# Patient Record
Sex: MALE | Race: WHITE | NOT HISPANIC OR LATINO
[De-identification: names, ages, dates, MRNs, and addresses within clinical notes are randomized per-mention and may not be internally consistent; named-entity substitution may affect disease eponyms.]

---

## 2019-06-05 ENCOUNTER — APPOINTMENT (OUTPATIENT)
Dept: ORTHOPEDIC SURGERY | Facility: CLINIC | Age: 44
End: 2019-06-05

## 2022-04-13 ENCOUNTER — APPOINTMENT (OUTPATIENT)
Dept: ORTHOPEDIC SURGERY | Facility: CLINIC | Age: 47
End: 2022-04-13
Payer: OTHER GOVERNMENT

## 2022-04-13 VITALS — HEIGHT: 72 IN | BODY MASS INDEX: 30.48 KG/M2 | WEIGHT: 225 LBS

## 2022-04-13 DIAGNOSIS — Z78.9 OTHER SPECIFIED HEALTH STATUS: ICD-10-CM

## 2022-04-13 PROCEDURE — 99072 ADDL SUPL MATRL&STAF TM PHE: CPT

## 2022-04-13 PROCEDURE — 99214 OFFICE O/P EST MOD 30 MIN: CPT

## 2022-04-13 NOTE — DISCUSSION/SUMMARY
[Medication Risks Reviewed] : Medication risks reviewed [de-identified] : lidoderm\par HEP\par Ice.

## 2022-04-13 NOTE — ASSESSMENT
[FreeTextEntry1] : Patient allowed to gently start resuming activities. \par Discussed change to medication prescription and usage. \par Bracing options discussed with patient. \par Activity modification as needed\par Discussed poss future surgery, pt deciding, he is leaning towards living with it\par

## 2022-04-13 NOTE — HISTORY OF PRESENT ILLNESS
[4] : 4 [2] : 2 [Dull/Aching] : dull/aching [Sharp] : sharp [Intermittent] : intermittent [Rest] : rest [Not working due to injury] : Work status: not working due to injury [de-identified] : Patients left shoulder remains symptomatic, persistent symptoms with certain motions, lifting, pushing pulling.  No radicular complaints, uses llidoderm patches. pt is here today for a WC follow up of his left shoulder. pt states his level of pain is  [] : no [FreeTextEntry1] : left shoulder  [de-identified] : motion

## 2022-04-13 NOTE — PHYSICAL EXAM
[Left] : left shoulder [Sitting] : sitting [Mild] : mild [] : no ecchymosis [TWNoteComboBox7] : active forward flexion 160 degrees [TWNoteComboBox4] : False [TWNoteComboBox6] : internal rotation L2 [de-identified] : external rotation 65 degrees

## 2022-04-13 NOTE — WORK
[Total] : total [Does not reveal pre-existing condition(s) that may affect treatment/prognosis] : does not reveal pre-existing condition(s) that may affect treatment/prognosis [Cannot return to work because ________] : cannot return to work because [unfilled] [Unknown at this time] : : unknown at this time [Patient] : patient [No] : No [No Rx restrictions] : No Rx restrictions. [I provided the services listed above] :  I provided the services listed above. 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

## 2022-05-25 ENCOUNTER — APPOINTMENT (OUTPATIENT)
Dept: ORTHOPEDIC SURGERY | Facility: CLINIC | Age: 47
End: 2022-05-25
Payer: OTHER GOVERNMENT

## 2022-05-25 VITALS — BODY MASS INDEX: 30.48 KG/M2 | HEIGHT: 72 IN | WEIGHT: 225 LBS

## 2022-05-25 PROCEDURE — 99214 OFFICE O/P EST MOD 30 MIN: CPT

## 2022-05-25 PROCEDURE — 99072 ADDL SUPL MATRL&STAF TM PHE: CPT

## 2022-05-25 RX ORDER — LIDOCAINE 5% 700 MG/1
5 PATCH TOPICAL
Qty: 30 | Refills: 0 | Status: ACTIVE | COMMUNITY
Start: 2022-05-25 | End: 1900-01-01

## 2022-05-25 NOTE — PHYSICAL EXAM
[Left] : left shoulder [Sitting] : sitting [Mild] : mild [] : motor and sensory intact distally [TWNoteComboBox7] : active forward flexion 160 degrees [TWNoteComboBox6] : internal rotation L2 [de-identified] : external rotation 65 degrees

## 2022-05-25 NOTE — HISTORY OF PRESENT ILLNESS
[Dull/Aching] : dull/aching [Localized] : localized [Sharp] : sharp [Intermittent] : intermittent [Leisure] : leisure [Sleep] : sleep [Rest] : rest [Meds] : meds [de-identified] : Patients left shoulder remains symptomatic, especially with certain motions, lifting, pushing pulling.  No radicular complaints, uses llidoderm patches. pt is here today for a WC follow up of his left shoulder.  [4] : 4 [2] : 2 [Not working due to injury] : Work status: not working due to injury [] : yes [FreeTextEntry1] : left shoulder  [FreeTextEntry5] : workers comp follow up [FreeTextEntry9] : l [de-identified] : motion  [de-identified] :  [de-identified] : XR

## 2022-05-25 NOTE — DISCUSSION/SUMMARY
[Medication Risks Reviewed] : Medication risks reviewed [de-identified] : lidoderm patches as needed\par HEP\par Ice.

## 2022-07-06 ENCOUNTER — APPOINTMENT (OUTPATIENT)
Dept: ORTHOPEDIC SURGERY | Facility: CLINIC | Age: 47
End: 2022-07-06

## 2022-07-06 VITALS — WEIGHT: 225 LBS | HEIGHT: 72 IN | BODY MASS INDEX: 30.48 KG/M2

## 2022-07-06 PROCEDURE — 99072 ADDL SUPL MATRL&STAF TM PHE: CPT

## 2022-07-06 PROCEDURE — 99214 OFFICE O/P EST MOD 30 MIN: CPT

## 2022-07-06 RX ORDER — LIDOCAINE 5% 700 MG/1
5 PATCH TOPICAL
Qty: 90 | Refills: 0 | Status: ACTIVE | COMMUNITY
Start: 2022-07-06 | End: 1900-01-01

## 2022-07-06 NOTE — PHYSICAL EXAM
[Left] : left shoulder [Sitting] : sitting [Mild] : mild [] : motor and sensory intact distally [TWNoteComboBox7] : active forward flexion 160 degrees [TWNoteComboBox6] : internal rotation L2 [de-identified] : external rotation 65 degrees

## 2022-07-06 NOTE — HISTORY OF PRESENT ILLNESS
[Sharp] : sharp [Not working due to injury] : Work status: not working due to injury [de-identified] : Patients left shoulder remains symptomatic, especially with certain motions, lifting, pushing pulling.  No radicular complaints, uses llidoderm patches with help. pt is here today for a WC follow up of his left shoulder.  [4] : 4 [2] : 2 [Dull/Aching] : dull/aching [Localized] : localized [Intermittent] : intermittent [Leisure] : leisure [Sleep] : sleep [Rest] : rest [Meds] : meds [] : Post Surgical Visit: no [FreeTextEntry1] : left shoulder  [FreeTextEntry5] : workers comp follow up [de-identified] : motion  [de-identified] :  [de-identified] : XR

## 2022-07-06 NOTE — WORK
[Total] : total [Does not reveal pre-existing condition(s) that may affect treatment/prognosis] : does not reveal pre-existing condition(s) that may affect treatment/prognosis [Cannot return to work because ________] : cannot return to work because [unfilled] [Unknown at this time] : : unknown at this time [Patient] : patient [No] : No [No Rx restrictions] : No Rx restrictions. [I provided the services listed above] :  I provided the services listed above. [FreeTextEntry1] : poor

## 2022-07-06 NOTE — DISCUSSION/SUMMARY
[Medication Risks Reviewed] : Medication risks reviewed [de-identified] : lidoderm patches as needed\par HEP\par Ice.

## 2022-08-23 ENCOUNTER — APPOINTMENT (OUTPATIENT)
Dept: ORTHOPEDIC SURGERY | Facility: CLINIC | Age: 47
End: 2022-08-23

## 2022-08-24 ENCOUNTER — APPOINTMENT (OUTPATIENT)
Dept: ORTHOPEDIC SURGERY | Facility: CLINIC | Age: 47
End: 2022-08-24

## 2022-08-24 VITALS — BODY MASS INDEX: 26.03 KG/M2 | HEIGHT: 78 IN | WEIGHT: 225 LBS

## 2022-08-24 PROCEDURE — 99213 OFFICE O/P EST LOW 20 MIN: CPT

## 2022-08-24 PROCEDURE — 99072 ADDL SUPL MATRL&STAF TM PHE: CPT

## 2022-08-24 NOTE — DISCUSSION/SUMMARY
[Medication Risks Reviewed] : Medication risks reviewed [de-identified] : lidoderm patches as needed\par HEP\par Ice.

## 2022-08-24 NOTE — PHYSICAL EXAM
[Left] : left shoulder [Sitting] : sitting [Mild] : mild [] : no ecchymosis [TWNoteComboBox7] : active forward flexion 160 degrees [TWNoteComboBox6] : internal rotation L2 [de-identified] : external rotation 65 degrees

## 2022-08-24 NOTE — HISTORY OF PRESENT ILLNESS
[Sharp] : sharp [Intermittent] : intermittent [Leisure] : leisure [Sleep] : sleep [Rest] : rest [Meds] : meds [Ice] : ice [Massage] : massage [Not working due to injury] : Work status: not working due to injury [4] : 4 [2] : 2 [Dull/Aching] : dull/aching [Localized] : localized [de-identified] : Patients left shoulder remains symptomatic, especially with certain motions, lifting, pushing pulling.  No radicular complaints, uses llidoderm patches with help. pt is here today for a WC follow up of his left shoulder.  [] : Post Surgical Visit: no [FreeTextEntry1] : left shoulder  [FreeTextEntry3] : 6/30/06 [FreeTextEntry5] : workers comp follow up [de-identified] : motion  [de-identified] :  [de-identified] : XR [de-identified] : lidocaine patches \par brace

## 2022-10-06 ENCOUNTER — APPOINTMENT (OUTPATIENT)
Dept: ORTHOPEDIC SURGERY | Facility: CLINIC | Age: 47
End: 2022-10-06

## 2022-10-06 VITALS — WEIGHT: 220 LBS | BODY MASS INDEX: 29.8 KG/M2 | HEIGHT: 72 IN

## 2022-10-06 PROCEDURE — 99072 ADDL SUPL MATRL&STAF TM PHE: CPT

## 2022-10-06 PROCEDURE — 99214 OFFICE O/P EST MOD 30 MIN: CPT

## 2022-10-06 RX ORDER — LIDOCAINE 5% 700 MG/1
5 PATCH TOPICAL
Qty: 60 | Refills: 0 | Status: ACTIVE | COMMUNITY
Start: 2022-10-06 | End: 1900-01-01

## 2022-10-06 NOTE — HISTORY OF PRESENT ILLNESS
[Sharp] : sharp [Not working due to injury] : Work status: not working due to injury [de-identified] : Patients left shoulder remains symptomatic, especially with certain motions, lifting, pushing pulling.  No radicular complaints, uses llidoderm patches  help. pt is here today for a WC follow up of his left shoulder.  [4] : 4 [2] : 2 [Dull/Aching] : dull/aching [Localized] : localized [Intermittent] : intermittent [Leisure] : leisure [Sleep] : sleep [Rest] : rest [Meds] : meds [Ice] : ice [Massage] : massage [] : Post Surgical Visit: no [FreeTextEntry1] : left shoulder  [FreeTextEntry3] : 6/30/06 [FreeTextEntry5] : workers comp follow up [de-identified] : motion  [de-identified] :  [de-identified] : XR [de-identified] : lidocaine patches \par brace

## 2022-10-06 NOTE — DISCUSSION/SUMMARY
[Medication Risks Reviewed] : Medication risks reviewed [de-identified] : lidoderm patches as needed\par HEP\par Ice.

## 2022-10-06 NOTE — PHYSICAL EXAM
[Left] : left shoulder [Sitting] : sitting [Mild] : mild [] : motor and sensory intact distally [TWNoteComboBox7] : active forward flexion 160 degrees [TWNoteComboBox6] : internal rotation L2 [de-identified] : external rotation 65 degrees

## 2022-11-17 ENCOUNTER — APPOINTMENT (OUTPATIENT)
Dept: ORTHOPEDIC SURGERY | Facility: CLINIC | Age: 47
End: 2022-11-17

## 2022-11-17 VITALS — HEIGHT: 72 IN | BODY MASS INDEX: 29.8 KG/M2 | WEIGHT: 220 LBS

## 2022-11-17 PROCEDURE — 99214 OFFICE O/P EST MOD 30 MIN: CPT

## 2022-11-17 PROCEDURE — 99072 ADDL SUPL MATRL&STAF TM PHE: CPT

## 2022-11-17 NOTE — PHYSICAL EXAM
[Left] : left shoulder [Sitting] : sitting [Mild] : mild [] : motor and sensory intact distally [TWNoteComboBox7] : active forward flexion 160 degrees [TWNoteComboBox6] : internal rotation L2 [de-identified] : external rotation 65 degrees

## 2022-11-17 NOTE — HISTORY OF PRESENT ILLNESS
[Sharp] : sharp [Stabbing] : stabbing [Not working due to injury] : Work status: not working due to injury [de-identified] : Patients left shoulder remains symptomatic, especially with certain motions, lifting, pushing pulling.  No radicular complaints, uses llidoderm patches  help. pt is here today for a WC follow up of his left shoulder.  [4] : 4 [2] : 2 [Dull/Aching] : dull/aching [Localized] : localized [Intermittent] : intermittent [Leisure] : leisure [Sleep] : sleep [Rest] : rest [Meds] : meds [Ice] : ice [Massage] : massage [] : Post Surgical Visit: no [FreeTextEntry1] : left shoulder  [FreeTextEntry3] : 6/30/06 [FreeTextEntry5] : workers comp follow up [de-identified] : motion  [de-identified] :  [de-identified] : XR [de-identified] : lidocaine patches \par brace

## 2022-11-17 NOTE — DISCUSSION/SUMMARY
[Medication Risks Reviewed] : Medication risks reviewed [de-identified] : lidoderm patches as needed\par HEP\par Ice.

## 2022-12-29 ENCOUNTER — APPOINTMENT (OUTPATIENT)
Dept: ORTHOPEDIC SURGERY | Facility: CLINIC | Age: 47
End: 2022-12-29

## 2023-01-04 ENCOUNTER — APPOINTMENT (OUTPATIENT)
Dept: ORTHOPEDIC SURGERY | Facility: CLINIC | Age: 48
End: 2023-01-04
Payer: OTHER GOVERNMENT

## 2023-01-04 VITALS — WEIGHT: 220 LBS | HEIGHT: 72 IN | BODY MASS INDEX: 29.8 KG/M2

## 2023-01-04 PROCEDURE — 99072 ADDL SUPL MATRL&STAF TM PHE: CPT

## 2023-01-04 PROCEDURE — 99214 OFFICE O/P EST MOD 30 MIN: CPT

## 2023-01-04 RX ORDER — LIDOCAINE 5% 700 MG/1
5 PATCH TOPICAL
Qty: 30 | Refills: 0 | Status: ACTIVE | COMMUNITY
Start: 2023-01-04 | End: 1900-01-01

## 2023-01-04 NOTE — HISTORY OF PRESENT ILLNESS
[Sharp] : sharp [Stabbing] : stabbing [Not working due to injury] : Work status: not working due to injury [de-identified] : Patients left shoulder remains symptomatic, especially with certain motions, lifting, pushing pulling.  No radicular complaints, uses llidoderm patches [4] : 4 [2] : 2 [Dull/Aching] : dull/aching [Localized] : localized [Intermittent] : intermittent [Leisure] : leisure [Sleep] : sleep [Rest] : rest [Meds] : meds [Ice] : ice [Massage] : massage [] : Post Surgical Visit: no [FreeTextEntry1] : left shoulder  [FreeTextEntry3] : 6/30/06 [FreeTextEntry5] : workers comp follow up [de-identified] : motion  [de-identified] :  [de-identified] : XR [de-identified] : lidocaine patches \par brace

## 2023-01-04 NOTE — DISCUSSION/SUMMARY
[Medication Risks Reviewed] : Medication risks reviewed [de-identified] : lidoderm patches as needed\par HEP\par Ice.

## 2023-01-04 NOTE — PHYSICAL EXAM
[Left] : left shoulder [Sitting] : sitting [Mild] : mild [] : no ecchymosis [TWNoteComboBox7] : active forward flexion 160 degrees [TWNoteComboBox6] : internal rotation L2 [de-identified] : external rotation 65 degrees

## 2023-02-15 ENCOUNTER — APPOINTMENT (OUTPATIENT)
Dept: ORTHOPEDIC SURGERY | Facility: CLINIC | Age: 48
End: 2023-02-15
Payer: OTHER GOVERNMENT

## 2023-02-15 VITALS — BODY MASS INDEX: 29.8 KG/M2 | HEIGHT: 72 IN | WEIGHT: 220 LBS

## 2023-02-15 PROCEDURE — 99213 OFFICE O/P EST LOW 20 MIN: CPT

## 2023-02-15 PROCEDURE — 99072 ADDL SUPL MATRL&STAF TM PHE: CPT

## 2023-02-15 NOTE — DISCUSSION/SUMMARY
[Medication Risks Reviewed] : Medication risks reviewed [de-identified] : lidoderm patches as needed\par HEP\par Ice.\par mod activity

## 2023-02-15 NOTE — PHYSICAL EXAM
[Left] : left shoulder [Sitting] : sitting [Mild] : mild [] : motor and sensory intact distally [TWNoteComboBox7] : active forward flexion 160 degrees [TWNoteComboBox6] : internal rotation L2 [de-identified] : external rotation 65 degrees

## 2023-02-15 NOTE — HISTORY OF PRESENT ILLNESS
[Sharp] : sharp [Stabbing] : stabbing [Not working due to injury] : Work status: not working due to injury [de-identified] : Patients left shoulder remains symptomatic, especially with certain motions, lifting, pushing pulling.  No radicular complaints, uses llidoderm patches when needed [4] : 4 [2] : 2 [Dull/Aching] : dull/aching [Localized] : localized [Intermittent] : intermittent [Leisure] : leisure [Sleep] : sleep [Rest] : rest [Meds] : meds [Ice] : ice [Massage] : massage [] : Post Surgical Visit: no [FreeTextEntry1] : left shoulder  [FreeTextEntry3] : 6/30/06 [FreeTextEntry5] : workers comp follow up [de-identified] : motion  [de-identified] :  [de-identified] : XR [de-identified] : lidocaine patches \par brace

## 2023-03-29 ENCOUNTER — APPOINTMENT (OUTPATIENT)
Dept: ORTHOPEDIC SURGERY | Facility: CLINIC | Age: 48
End: 2023-03-29
Payer: OTHER GOVERNMENT

## 2023-03-29 VITALS — WEIGHT: 220 LBS | HEIGHT: 72 IN | BODY MASS INDEX: 29.8 KG/M2

## 2023-03-29 PROCEDURE — 99214 OFFICE O/P EST MOD 30 MIN: CPT

## 2023-03-29 RX ORDER — LIDOCAINE 5% 700 MG/1
5 PATCH TOPICAL
Qty: 30 | Refills: 2 | Status: ACTIVE | COMMUNITY
Start: 2023-03-29 | End: 1900-01-01

## 2023-03-29 NOTE — WORK
[Total (100%)] : total (100%) [Does not reveal pre-existing condition(s) that may affect treatment/prognosis] : does not reveal pre-existing condition(s) that may affect treatment/prognosis [Cannot return to work because ________] : cannot return to work because [unfilled] [Unknown at this time] : : unknown at this time [Patient] : patient [No] : No [No Rx restrictions] : No Rx restrictions. [I provided the services listed above] :  I provided the services listed above. [FreeTextEntry1] : poor

## 2023-03-29 NOTE — ASSESSMENT
[FreeTextEntry1] : Patient allowed to gently start resuming activities. \par Discussed change to medication prescription and usage. \par Activity modification as needed\par Discussed poss future surgery, pt deciding, he is leaning towards living with it\par he was told needed to come in every 6 weeks for re-eval\par

## 2023-03-29 NOTE — HISTORY OF PRESENT ILLNESS
[4] : 4 [2] : 2 [Dull/Aching] : dull/aching [Localized] : localized [Sharp] : sharp [Stabbing] : stabbing [Intermittent] : intermittent [Leisure] : leisure [Sleep] : sleep [Rest] : rest [Meds] : meds [Ice] : ice [Massage] : massage [Not working due to injury] : Work status: not working due to injury [de-identified] : Patients left shoulder remains symptomatic, especially with certain motions, lifting, pushing pulling.  No radicular complaints, uses llidoderm patches when needed, had flare up last week [] : Post Surgical Visit: no [FreeTextEntry1] : left shoulder  [FreeTextEntry3] : 6/30/06 [FreeTextEntry5] : workers comp follow up [de-identified] : motion  [de-identified] :  [de-identified] : XR [de-identified] : lidocaine patches \par brace

## 2023-03-29 NOTE — DISCUSSION/SUMMARY
[Medication Risks Reviewed] : Medication risks reviewed [de-identified] : lidoderm patches as needed\par HEP\par Ice.\par mod activity

## 2023-03-29 NOTE — PHYSICAL EXAM
[Left] : left shoulder [Sitting] : sitting [Mild] : mild [] : motor and sensory intact distally [TWNoteComboBox7] : active forward flexion 165 degrees [TWNoteComboBox6] : internal rotation L2 [de-identified] : external rotation 65 degrees

## 2023-05-11 ENCOUNTER — APPOINTMENT (OUTPATIENT)
Dept: ORTHOPEDIC SURGERY | Facility: CLINIC | Age: 48
End: 2023-05-11
Payer: OTHER GOVERNMENT

## 2023-05-11 VITALS — BODY MASS INDEX: 29.8 KG/M2 | WEIGHT: 220 LBS | HEIGHT: 72 IN

## 2023-05-11 PROCEDURE — 99213 OFFICE O/P EST LOW 20 MIN: CPT

## 2023-05-11 NOTE — PHYSICAL EXAM
[Left] : left shoulder [Sitting] : sitting [Mild] : mild [] : no ecchymosis [TWNoteComboBox7] : active forward flexion 165 degrees [TWNoteComboBox4] : passive forward flexion 170 degrees [TWNoteComboBox6] : internal rotation L2 [de-identified] : external rotation 65 degrees

## 2023-05-11 NOTE — DISCUSSION/SUMMARY
[Medication Risks Reviewed] : Medication risks reviewed [de-identified] : lidoderm patches as needed\par HEP\par Ice.\par mod activity\par [place] [facility] wants him to come in every 6 weeks

## 2023-05-11 NOTE — HISTORY OF PRESENT ILLNESS
[9] : 9 [6] : 6 [Not working due to injury] : Work status: not working due to injury [] : yes [de-identified] : Patients left shoulder remains symptomatic, especially with certain motions, lifting, pushing pulling.  No radicular complaints, uses llidoderm patches when needed,feels the same [FreeTextEntry1] : left shoulder  [de-identified] : electric stim used and lidoderm patches used as needed

## 2023-06-22 ENCOUNTER — APPOINTMENT (OUTPATIENT)
Dept: ORTHOPEDIC SURGERY | Facility: CLINIC | Age: 48
End: 2023-06-22
Payer: OTHER GOVERNMENT

## 2023-06-22 VITALS — HEIGHT: 72 IN | WEIGHT: 220 LBS | BODY MASS INDEX: 29.8 KG/M2

## 2023-06-22 PROCEDURE — 99213 OFFICE O/P EST LOW 20 MIN: CPT

## 2023-06-22 RX ORDER — LIDOCAINE 5% 700 MG/1
5 PATCH TOPICAL
Qty: 30 | Refills: 0 | Status: ACTIVE | COMMUNITY
Start: 2023-06-22 | End: 1900-01-01

## 2023-06-22 NOTE — HISTORY OF PRESENT ILLNESS
[9] : 9 [6] : 6 [Not working due to injury] : Work status: not working due to injury [] : yes [de-identified] : Patients left shoulder remains symptomatic, especially with certain motions, felt a "teariing" sensation last week [FreeTextEntry1] : left shoulder  [de-identified] : electric stim used and lidoderm patches used as needed

## 2023-06-22 NOTE — DISCUSSION/SUMMARY
[Medication Risks Reviewed] : Medication risks reviewed [de-identified] : lidoderm patches as needed\par HEP\par Ice.\par mod activity

## 2023-06-22 NOTE — PHYSICAL EXAM
[Left] : left shoulder [Sitting] : sitting [Mild] : mild [] : no swelling [TWNoteComboBox7] : active forward flexion 165 degrees [TWNoteComboBox4] : passive forward flexion 170 degrees [TWNoteComboBox6] : internal rotation 10 degrees [de-identified] : external rotation 65 degrees

## 2023-08-02 ENCOUNTER — APPOINTMENT (OUTPATIENT)
Dept: ORTHOPEDIC SURGERY | Facility: CLINIC | Age: 48
End: 2023-08-02

## 2023-08-02 ENCOUNTER — APPOINTMENT (OUTPATIENT)
Dept: ORTHOPEDIC SURGERY | Facility: CLINIC | Age: 48
End: 2023-08-02
Payer: OTHER GOVERNMENT

## 2023-08-02 VITALS — HEIGHT: 72 IN | WEIGHT: 215 LBS | BODY MASS INDEX: 29.12 KG/M2

## 2023-08-02 PROCEDURE — 99214 OFFICE O/P EST MOD 30 MIN: CPT

## 2023-08-02 NOTE — PHYSICAL EXAM
[Left] : left shoulder [Sitting] : sitting [Mild] : mild [] : motor and sensory intact distally [TWNoteComboBox7] : active forward flexion 165 degrees [TWNoteComboBox4] : passive forward flexion 170 degrees [TWNoteComboBox6] : internal rotation 10 degrees [de-identified] : external rotation 65 degrees

## 2023-08-02 NOTE — HISTORY OF PRESENT ILLNESS
[Work related] : work related [9] : 9 [Not working due to injury] : Work status: not working due to injury [] : yes [de-identified] : Patients left shoulder remains symptomatic, especially with certain motions, felt a "teariing" sensation last week Feels the same [6] : 6 [FreeTextEntry1] : left shoulder  [FreeTextEntry3] : 06/30/2006 [de-identified] : electric stim used and lidoderm patches used as needed

## 2023-09-13 ENCOUNTER — APPOINTMENT (OUTPATIENT)
Dept: ORTHOPEDIC SURGERY | Facility: CLINIC | Age: 48
End: 2023-09-13
Payer: OTHER GOVERNMENT

## 2023-09-13 VITALS — HEIGHT: 72 IN | BODY MASS INDEX: 29.12 KG/M2 | WEIGHT: 215 LBS

## 2023-09-13 VITALS — WEIGHT: 215 LBS | HEIGHT: 72 IN | BODY MASS INDEX: 29.12 KG/M2

## 2023-09-13 PROCEDURE — 99214 OFFICE O/P EST MOD 30 MIN: CPT

## 2023-09-13 RX ORDER — LIDOCAINE 5% 700 MG/1
5 PATCH TOPICAL
Qty: 30 | Refills: 0 | Status: ACTIVE | COMMUNITY
Start: 2023-09-13 | End: 1900-01-01

## 2023-10-25 ENCOUNTER — APPOINTMENT (OUTPATIENT)
Dept: ORTHOPEDIC SURGERY | Facility: CLINIC | Age: 48
End: 2023-10-25
Payer: OTHER GOVERNMENT

## 2023-10-25 VITALS — BODY MASS INDEX: 29.12 KG/M2 | HEIGHT: 72 IN | WEIGHT: 215 LBS

## 2023-10-25 DIAGNOSIS — S43.439A SUPERIOR GLENOID LABRUM LESION OF UNSPECIFIED SHOULDER, INITIAL ENCOUNTER: ICD-10-CM

## 2023-10-25 PROCEDURE — 99214 OFFICE O/P EST MOD 30 MIN: CPT

## 2023-10-25 RX ORDER — LIDOCAINE 5% 700 MG/1
5 PATCH TOPICAL
Qty: 30 | Refills: 0 | Status: ACTIVE | COMMUNITY
Start: 2023-10-25 | End: 1900-01-01

## 2023-12-06 ENCOUNTER — APPOINTMENT (OUTPATIENT)
Dept: ORTHOPEDIC SURGERY | Facility: CLINIC | Age: 48
End: 2023-12-06
Payer: OTHER GOVERNMENT

## 2023-12-06 VITALS — BODY MASS INDEX: 29.12 KG/M2 | HEIGHT: 72 IN | WEIGHT: 215 LBS

## 2023-12-06 PROCEDURE — 99213 OFFICE O/P EST LOW 20 MIN: CPT

## 2023-12-08 NOTE — ASSESSMENT
[FreeTextEntry1] : Patient allowed to gently start resuming activities. \par Discussed change to medication prescription and usage. \par Bracing options discussed with patient. \par Activity modification as needed\par Discussed poss future surgery, pt deciding, he is leaning towards living with it\par 
show

## 2024-01-18 ENCOUNTER — APPOINTMENT (OUTPATIENT)
Dept: ORTHOPEDIC SURGERY | Facility: CLINIC | Age: 49
End: 2024-01-18
Payer: OTHER GOVERNMENT

## 2024-01-18 VITALS — BODY MASS INDEX: 29.12 KG/M2 | HEIGHT: 72 IN | WEIGHT: 215 LBS

## 2024-01-18 PROCEDURE — 99213 OFFICE O/P EST LOW 20 MIN: CPT

## 2024-01-18 NOTE — HISTORY OF PRESENT ILLNESS
[Work related] : work related [Sharp] : sharp [Shooting] : shooting [Intermittent] : intermittent [Rest] : rest [Meds] : meds [Lying in bed] : lying in bed [de-identified] : Patients left shoulder remains symptomatic, especially with certain motions, no recent flares, lidoderm helps, uses OTCs [7] : 7 [6] : 6 [Dull/Aching] : dull/aching [Stabbing] : stabbing [Not working due to injury] : Work status: not working due to injury [] : Post Surgical Visit: no [FreeTextEntry1] : left shoulder  [FreeTextEntry3] : 6/30/2006 [FreeTextEntry5] : Patient is having some pain in left shoulder.  [FreeTextEntry9] : advil  [de-identified] : HEP.

## 2024-01-18 NOTE — DISCUSSION/SUMMARY
[Medication Risks Reviewed] : Medication risks reviewed [de-identified] : lidoderm patches as needed modify activities try OTC meds ice as needed try topical lidocaine reviewed current medications used by this patient

## 2024-02-29 ENCOUNTER — APPOINTMENT (OUTPATIENT)
Dept: ORTHOPEDIC SURGERY | Facility: CLINIC | Age: 49
End: 2024-02-29
Payer: OTHER GOVERNMENT

## 2024-02-29 VITALS — BODY MASS INDEX: 29.12 KG/M2 | WEIGHT: 215 LBS | HEIGHT: 72 IN

## 2024-02-29 PROCEDURE — 99214 OFFICE O/P EST MOD 30 MIN: CPT

## 2024-02-29 RX ORDER — LIDOCAINE 5% 700 MG/1
5 PATCH TOPICAL
Qty: 30 | Refills: 1 | Status: ACTIVE | COMMUNITY
Start: 2024-02-29 | End: 1900-01-01

## 2024-02-29 NOTE — HISTORY OF PRESENT ILLNESS
[Work related] : work related [Sharp] : sharp [Throbbing] : throbbing [Intermittent] : intermittent [Rest] : rest [Meds] : meds [Lying in bed] : lying in bed [de-identified] : Patients left shoulder remains symptomatic, especially with certain motions, no recent flares, lidoderm helps, uses OTCs [7] : 7 [6] : 6 [Dull/Aching] : dull/aching [Shooting] : shooting [Stabbing] : stabbing [Not working due to injury] : Work status: not working due to injury [] : Post Surgical Visit: no [FreeTextEntry1] : left shoulder  [FreeTextEntry5] : Patient is having some pain in left shoulder.  [FreeTextEntry3] : 6/30/2006 [de-identified] : movement  [FreeTextEntry9] : advil  [de-identified] : HEP.

## 2024-02-29 NOTE — PHYSICAL EXAM
[Left] : left shoulder [Sitting] : sitting [Mild] : mild [] : positive Speed's [TWNoteComboBox7] : active forward flexion 165 degrees [TWNoteComboBox4] : passive forward flexion 170 degrees [TWNoteComboBox6] : internal rotation 15 degrees [de-identified] : external rotation 65 degrees

## 2024-02-29 NOTE — REVIEW OF SYSTEMS
[Joint Pain] : joint pain [Negative] : Heme/Lymph [Joint Swelling] : no joint swelling [Joint Stiffness] : joint stiffness

## 2024-02-29 NOTE — DISCUSSION/SUMMARY
[Medication Risks Reviewed] : Medication risks reviewed [de-identified] : lidoderm patches as needed modify activities try OTC meds ice as needed try topical lidocaine reviewed current medications used by this patient

## 2024-04-18 ENCOUNTER — APPOINTMENT (OUTPATIENT)
Dept: ORTHOPEDIC SURGERY | Facility: CLINIC | Age: 49
End: 2024-04-18
Payer: OTHER GOVERNMENT

## 2024-04-18 VITALS — HEIGHT: 72 IN | BODY MASS INDEX: 29.12 KG/M2 | WEIGHT: 215 LBS

## 2024-04-18 PROCEDURE — 99214 OFFICE O/P EST MOD 30 MIN: CPT

## 2024-04-18 NOTE — PHYSICAL EXAM
[Left] : left shoulder [Sitting] : sitting [Mild] : mild [] : no tenderness at lateral shoulder [TWNoteComboBox7] : active forward flexion 165 degrees [TWNoteComboBox4] : passive forward flexion 170 degrees [TWNoteComboBox6] : internal rotation 15 degrees [de-identified] : external rotation 65 degrees

## 2024-04-18 NOTE — DISCUSSION/SUMMARY
[Medication Risks Reviewed] : Medication risks reviewed [de-identified] : lidoderm patches as needed modify activities try OTC meds ice as needed try topical lidocaine reviewed current medications used by this patient

## 2024-04-18 NOTE — HISTORY OF PRESENT ILLNESS
[Work related] : work related [Sudden] : sudden [7] : 7 [6] : 6 [Dull/Aching] : dull/aching [Sharp] : sharp [Shooting] : shooting [Throbbing] : throbbing [Intermittent] : intermittent [Rest] : rest [Meds] : meds [Lying in bed] : lying in bed [de-identified] : Patients left shoulder remains symptomatic, especially with certain motions, no recent flares, lidoderm helps, uses OTCs [Stabbing] : stabbing [Not working due to injury] : Work status: not working due to injury [] : Post Surgical Visit: no [FreeTextEntry1] : left shoulder  [FreeTextEntry3] : 6/30/2006 [FreeTextEntry5] : Patient is having some pain in left shoulder.  [FreeTextEntry9] : advil  [de-identified] : movement  [de-identified] : HEP.

## 2024-05-30 ENCOUNTER — APPOINTMENT (OUTPATIENT)
Dept: ORTHOPEDIC SURGERY | Facility: CLINIC | Age: 49
End: 2024-05-30
Payer: OTHER GOVERNMENT

## 2024-05-30 VITALS — HEIGHT: 72 IN | BODY MASS INDEX: 29.12 KG/M2 | WEIGHT: 215 LBS

## 2024-05-30 DIAGNOSIS — S43.432D SUPERIOR GLENOID LABRUM LESION OF LEFT SHOULDER, SUBSEQUENT ENCOUNTER: ICD-10-CM

## 2024-05-30 PROCEDURE — 99214 OFFICE O/P EST MOD 30 MIN: CPT

## 2024-05-30 RX ORDER — LIDOCAINE 5% 700 MG/1
5 PATCH TOPICAL
Qty: 30 | Refills: 1 | Status: ACTIVE | COMMUNITY
Start: 2024-05-30 | End: 2024-07-29

## 2024-05-30 NOTE — DISCUSSION/SUMMARY
[Medication Risks Reviewed] : Medication risks reviewed [de-identified] : lidoderm patches as needed modify activities try OTC meds ice as needed try topical lidocaine reviewed current medications used by this patient

## 2024-05-30 NOTE — PHYSICAL EXAM
[Left] : left shoulder [Sitting] : sitting [Mild] : mild [] : motor and sensory intact distally [TWNoteComboBox7] : active forward flexion 165 degrees [TWNoteComboBox4] : passive forward flexion 170 degrees [TWNoteComboBox6] : internal rotation 15 degrees [de-identified] : external rotation 65 degrees

## 2024-05-30 NOTE — HISTORY OF PRESENT ILLNESS
[Sudden] : sudden [Dull/Aching] : dull/aching [Sharp] : sharp [Throbbing] : throbbing [Intermittent] : intermittent [Rest] : rest [Meds] : meds [Lying in bed] : lying in bed [de-identified] : Patients left shoulder remains symptomatic, especially with certain motions, no recent flares, lidoderm helps, uses OTCs [Work related] : work related [7] : 7 [6] : 6 [Shooting] : shooting [Stabbing] : stabbing [Not working due to injury] : Work status: not working due to injury [] : Post Surgical Visit: no [FreeTextEntry1] : left shoulder  [FreeTextEntry3] : 6/30/2006 [FreeTextEntry5] : Patient is having some pain in left shoulder.  [FreeTextEntry9] : advil  [de-identified] : movement  [de-identified] : HEP.

## 2024-07-10 ENCOUNTER — APPOINTMENT (OUTPATIENT)
Dept: ORTHOPEDIC SURGERY | Facility: CLINIC | Age: 49
End: 2024-07-10
Payer: OTHER GOVERNMENT

## 2024-07-10 DIAGNOSIS — S43.432D SUPERIOR GLENOID LABRUM LESION OF LEFT SHOULDER, SUBSEQUENT ENCOUNTER: ICD-10-CM

## 2024-07-10 PROCEDURE — 99214 OFFICE O/P EST MOD 30 MIN: CPT

## 2024-08-21 ENCOUNTER — APPOINTMENT (OUTPATIENT)
Dept: ORTHOPEDIC SURGERY | Facility: CLINIC | Age: 49
End: 2024-08-21
Payer: OTHER GOVERNMENT

## 2024-08-21 DIAGNOSIS — S43.439A SUPERIOR GLENOID LABRUM LESION OF UNSPECIFIED SHOULDER, INITIAL ENCOUNTER: ICD-10-CM

## 2024-08-21 PROCEDURE — 99214 OFFICE O/P EST MOD 30 MIN: CPT

## 2024-08-21 RX ORDER — LIDOCAINE 5% 700 MG/1
5 PATCH TOPICAL
Qty: 30 | Refills: 1 | Status: ACTIVE | COMMUNITY
Start: 2024-08-21 | End: 2024-10-20

## 2024-08-21 NOTE — PHYSICAL EXAM
[Left] : left shoulder [Sitting] : sitting [Mild] : mild [] : no tenderness at lateral shoulder [TWNoteComboBox7] : active forward flexion 165 degrees [TWNoteComboBox4] : passive forward flexion 170 degrees [TWNoteComboBox6] : internal rotation 15 degrees [de-identified] : external rotation 65 degrees

## 2024-08-21 NOTE — DISCUSSION/SUMMARY
[Medication Risks Reviewed] : Medication risks reviewed [de-identified] : lidoderm patches as needed modify activities try OTC meds ice as needed try topical lidocaine reviewed current medications used by this patient

## 2024-08-21 NOTE — HISTORY OF PRESENT ILLNESS
[Work related] : work related [Sudden] : sudden [7] : 7 [6] : 6 [Dull/Aching] : dull/aching [Sharp] : sharp [Shooting] : shooting [Stabbing] : stabbing [Throbbing] : throbbing [Intermittent] : intermittent [Rest] : rest [Meds] : meds [Lying in bed] : lying in bed [Not working due to injury] : Work status: not working due to injury [de-identified] : Patients left shoulder remains symptomatic, especially with certain motions, had flare up recently, no new injury, lidoderm helps, uses OTCs [] : Post Surgical Visit: no [FreeTextEntry1] : left shoulder  [FreeTextEntry3] : 6/30/2006 [FreeTextEntry5] : Patient is having some pain in left shoulder.  [FreeTextEntry9] : advil  [de-identified] : movement  [de-identified] : HEP.

## 2024-10-02 ENCOUNTER — APPOINTMENT (OUTPATIENT)
Dept: ORTHOPEDIC SURGERY | Facility: CLINIC | Age: 49
End: 2024-10-02
Payer: OTHER GOVERNMENT

## 2024-10-02 VITALS — BODY MASS INDEX: 29.12 KG/M2 | HEIGHT: 72 IN | WEIGHT: 215 LBS

## 2024-10-02 DIAGNOSIS — S43.432D SUPERIOR GLENOID LABRUM LESION OF LEFT SHOULDER, SUBSEQUENT ENCOUNTER: ICD-10-CM

## 2024-10-02 PROCEDURE — 99214 OFFICE O/P EST MOD 30 MIN: CPT

## 2024-10-02 NOTE — PHYSICAL EXAM
[Left] : left shoulder [Sitting] : sitting [Mild] : mild [] : no tenderness at lateral shoulder [TWNoteComboBox7] : active forward flexion 165 degrees [TWNoteComboBox4] : passive forward flexion 170 degrees [TWNoteComboBox6] : internal rotation 15 degrees [de-identified] : external rotation 50 degrees

## 2024-10-02 NOTE — HISTORY OF PRESENT ILLNESS
[Work related] : work related [Sudden] : sudden [6] : 6 [Dull/Aching] : dull/aching [Sharp] : sharp [Shooting] : shooting [Stabbing] : stabbing [Throbbing] : throbbing [Intermittent] : intermittent [Rest] : rest [Meds] : meds [Lying in bed] : lying in bed [Not working due to injury] : Work status: not working due to injury [7] : 7 [de-identified] : Patients left shoulder remains symptomatic, especially with certain motions, had flare up recently, no new injury, lidoderm helps, uses OTCs [] : Post Surgical Visit: no [FreeTextEntry1] : left shoulder  [FreeTextEntry3] : 6/30/2006 [FreeTextEntry5] : Patient is having some pain in left shoulder.  [FreeTextEntry9] : advil  [de-identified] : movement  [de-identified] : Dr. Jeong [de-identified] : HEP.

## 2024-10-02 NOTE — DISCUSSION/SUMMARY
[Medication Risks Reviewed] : Medication risks reviewed [de-identified] : lidoderm patches as needed modify activities try OTC meds ice as needed try topical lidocaine reviewed current medications used by this patient

## 2024-11-14 ENCOUNTER — APPOINTMENT (OUTPATIENT)
Dept: ORTHOPEDIC SURGERY | Facility: CLINIC | Age: 49
End: 2024-11-14
Payer: OTHER GOVERNMENT

## 2024-11-14 VITALS — HEIGHT: 72 IN | BODY MASS INDEX: 29.12 KG/M2 | WEIGHT: 215 LBS

## 2024-11-14 DIAGNOSIS — S43.432D SUPERIOR GLENOID LABRUM LESION OF LEFT SHOULDER, SUBSEQUENT ENCOUNTER: ICD-10-CM

## 2024-11-14 PROCEDURE — 99214 OFFICE O/P EST MOD 30 MIN: CPT

## 2025-01-08 ENCOUNTER — APPOINTMENT (OUTPATIENT)
Dept: ORTHOPEDIC SURGERY | Facility: CLINIC | Age: 50
End: 2025-01-08
Payer: OTHER GOVERNMENT

## 2025-01-08 VITALS — BODY MASS INDEX: 29.12 KG/M2 | HEIGHT: 72 IN | WEIGHT: 215 LBS

## 2025-01-08 DIAGNOSIS — S43.439A SUPERIOR GLENOID LABRUM LESION OF UNSPECIFIED SHOULDER, INITIAL ENCOUNTER: ICD-10-CM

## 2025-01-08 DIAGNOSIS — S43.432D SUPERIOR GLENOID LABRUM LESION OF LEFT SHOULDER, SUBSEQUENT ENCOUNTER: ICD-10-CM

## 2025-01-08 PROCEDURE — 99214 OFFICE O/P EST MOD 30 MIN: CPT

## 2025-01-08 RX ORDER — LIDOCAINE 5% 700 MG/1
5 PATCH TOPICAL
Qty: 30 | Refills: 1 | Status: ACTIVE | COMMUNITY
Start: 2025-01-08 | End: 2025-03-09

## 2025-02-25 ENCOUNTER — APPOINTMENT (OUTPATIENT)
Dept: ORTHOPEDIC SURGERY | Facility: CLINIC | Age: 50
End: 2025-02-25
Payer: OTHER GOVERNMENT

## 2025-02-25 VITALS — HEIGHT: 72 IN | WEIGHT: 215 LBS | BODY MASS INDEX: 29.12 KG/M2

## 2025-02-25 DIAGNOSIS — S43.432D SUPERIOR GLENOID LABRUM LESION OF LEFT SHOULDER, SUBSEQUENT ENCOUNTER: ICD-10-CM

## 2025-02-25 DIAGNOSIS — S43.439A SUPERIOR GLENOID LABRUM LESION OF UNSPECIFIED SHOULDER, INITIAL ENCOUNTER: ICD-10-CM

## 2025-02-25 PROCEDURE — 99213 OFFICE O/P EST LOW 20 MIN: CPT

## 2025-04-09 ENCOUNTER — APPOINTMENT (OUTPATIENT)
Dept: ORTHOPEDIC SURGERY | Facility: CLINIC | Age: 50
End: 2025-04-09
Payer: OTHER GOVERNMENT

## 2025-04-09 VITALS — WEIGHT: 215 LBS | BODY MASS INDEX: 29.12 KG/M2 | HEIGHT: 72 IN

## 2025-04-09 DIAGNOSIS — S43.439A SUPERIOR GLENOID LABRUM LESION OF UNSPECIFIED SHOULDER, INITIAL ENCOUNTER: ICD-10-CM

## 2025-04-09 PROCEDURE — 99204 OFFICE O/P NEW MOD 45 MIN: CPT

## 2025-04-09 RX ORDER — LIDOCAINE 5% 700 MG/1
5 PATCH TOPICAL
Qty: 30 | Refills: 1 | Status: ACTIVE | COMMUNITY
Start: 2025-04-09 | End: 2025-06-08

## 2025-05-28 ENCOUNTER — APPOINTMENT (OUTPATIENT)
Dept: ORTHOPEDIC SURGERY | Facility: CLINIC | Age: 50
End: 2025-05-28
Payer: OTHER GOVERNMENT

## 2025-05-28 DIAGNOSIS — S43.432D SUPERIOR GLENOID LABRUM LESION OF LEFT SHOULDER, SUBSEQUENT ENCOUNTER: ICD-10-CM

## 2025-05-28 PROCEDURE — 99214 OFFICE O/P EST MOD 30 MIN: CPT

## 2025-07-16 ENCOUNTER — APPOINTMENT (OUTPATIENT)
Dept: ORTHOPEDIC SURGERY | Facility: CLINIC | Age: 50
End: 2025-07-16
Payer: OTHER GOVERNMENT

## 2025-07-16 PROCEDURE — 99214 OFFICE O/P EST MOD 30 MIN: CPT

## 2025-08-27 ENCOUNTER — APPOINTMENT (OUTPATIENT)
Dept: ORTHOPEDIC SURGERY | Facility: CLINIC | Age: 50
End: 2025-08-27
Payer: OTHER GOVERNMENT

## 2025-08-27 VITALS — HEIGHT: 72 IN | BODY MASS INDEX: 29.12 KG/M2 | WEIGHT: 215 LBS

## 2025-08-27 DIAGNOSIS — S43.439A SUPERIOR GLENOID LABRUM LESION OF UNSPECIFIED SHOULDER, INITIAL ENCOUNTER: ICD-10-CM

## 2025-08-27 PROCEDURE — 99214 OFFICE O/P EST MOD 30 MIN: CPT

## 2025-08-27 RX ORDER — LIDOCAINE 5% 700 MG/1
5 PATCH TOPICAL
Qty: 30 | Refills: 1 | Status: ACTIVE | COMMUNITY
Start: 2025-08-27 | End: 2025-10-26